# Patient Record
Sex: FEMALE | Race: WHITE | Employment: OTHER | URBAN - NONMETROPOLITAN AREA
[De-identification: names, ages, dates, MRNs, and addresses within clinical notes are randomized per-mention and may not be internally consistent; named-entity substitution may affect disease eponyms.]

---

## 2019-04-12 ENCOUNTER — TELEPHONE (OUTPATIENT)
Dept: FAMILY MEDICINE CLINIC | Age: 84
End: 2019-04-12

## 2020-08-18 ENCOUNTER — HOSPITAL ENCOUNTER (OUTPATIENT)
Dept: PHYSICAL THERAPY | Age: 85
Setting detail: THERAPIES SERIES
Discharge: HOME | End: 2020-08-18
Payer: COMMERCIAL

## 2020-08-18 PROCEDURE — 97110 THERAPEUTIC EXERCISES: CPT

## 2020-08-18 PROCEDURE — 97162 PT EVAL MOD COMPLEX 30 MIN: CPT

## 2020-08-18 ASSESSMENT — PAIN DESCRIPTION - PAIN TYPE: TYPE: CHRONIC PAIN

## 2020-08-18 ASSESSMENT — PAIN - FUNCTIONAL ASSESSMENT: PAIN_FUNCTIONAL_ASSESSMENT: PREVENTS OR INTERFERES SOME ACTIVE ACTIVITIES AND ADLS

## 2020-08-18 ASSESSMENT — PAIN DESCRIPTION - DESCRIPTORS: DESCRIPTORS: ACHING

## 2020-08-18 ASSESSMENT — PAIN DESCRIPTION - PROGRESSION: CLINICAL_PROGRESSION: NOT CHANGED

## 2020-08-18 ASSESSMENT — PAIN DESCRIPTION - LOCATION: LOCATION: SHOULDER

## 2020-08-18 ASSESSMENT — PAIN SCALES - WONG BAKER: WONGBAKER_NUMERICALRESPONSE: 8

## 2020-08-18 ASSESSMENT — PAIN DESCRIPTION - ONSET: ONSET: ON-GOING

## 2020-08-18 ASSESSMENT — PAIN DESCRIPTION - FREQUENCY: FREQUENCY: INTERMITTENT

## 2020-08-18 ASSESSMENT — PAIN DESCRIPTION - ORIENTATION: ORIENTATION: RIGHT;MID;UPPER

## 2020-08-18 NOTE — FLOWSHEET NOTE
Outpatient Physical Therapy  [facility]           [x] Phone: [phone]   Fax: [phone]  [facility] Lank           [] Phone: [phone]   Fax: [phone]        Physical Therapy Daily Treatment Note  Date:  2020    Patient Name:  Yulia Vitale    :  1935  MRN: [id number]  Restrictions/Precautions:  Other position/activity restrictions: none  Diagnosis:   Diagnosis: R shoulder pain  Date of Injury/Surgery:   Treatment Diagnosis: Treatment Diagnosis: R shoudler pain    Insurance/Certification information: PT Insurance Information: Medicare Advantage $40 co pay   Referring Physician:  Sanjuan Schlatter Next Doctor Visit:    Plan of care signed (Y/N):    Outcome Measure: LEFS  Visit# / total visits:  1 /  Pain level: 8/10  With movement   Goals:       Short term goals  Time Frame for Short term goals: 6 weeks  Short term goal 1: patient and her family will be compliant with HEP  Short term goal 2: patient's goal- no R shoulder pain       Summary of Evaluation:    Patient primary complaints: R shoulder pain  History of condition:ongoing R shoulder pain for about 1 year possibly due to overuse with reaching- has had 2 cortizone injections most recently about 6 months ago;   Current functional limitations:  R shoulder pain present/worse with reaching above shoulder level or out to side away from body  Clinical findings:seated AROM of R shoulder limited by pain- IR behind back to T12 before pain onset - FLEX to 90* before pain onset- patient demonstrates painful arc with R shoulder AROM in supine; unable to assess MMT due to  pain with AROM - R shoulder strength @ least 3+/5  R shoulder pain present with impingement testing  PLOF:enjoys gardening  Skilled PT interventions are intended to: establish HEP   PT program discussed with patient and patient's family due to patient's cognitive state- family in agreement with PT plan and goal of establishing HEP  Barriers to learning:cognitive barriers observed  Preferred learning style(s):   written- demonstration -practice  Preferred Language: English  Potential barriers to progress: cognitive barriers  The patient appears motivated to participate in PT : yes      Subjective:  See eval         Any changes in Ambulatory Summary Sheet? None        Objective:  See eval   Prior to today's treatment session, patient was screened for signs and symptoms related to COVID-19 including but not limited to verbally answering questions related to feeling ill, cough, or SOB, along with taking temperature via forehead thermometer. Patient presented with all negative signs and symptoms and had no fever >100 degrees Fahrenheit this date.          Exercises: (No more than 4 columns)   Exercise/Equipment Date 8/18/20 Date Date      PT eval     WARM UP                     TABLE      Supine  AROM     Seated  AROM                    Pulleys FLEX - painful     STANDING                                                     PROPRIOCEPTION                                    MODALITIES                      Other Therapeutic Activities/Education:        Home Exercise Program:  Supine  elevation AROM and seated internal rotation behind back AROM- family instructed- family expressed understanding      Manual Treatments:        Modalities:        Communication with other providers:        Assessment:  (Response towards treatment session) (Pain Rating)Pt tolerated tx well without any adverse reactions or complications this date; end session pain rating 0/10 pain @ rest         Plan for Next Session:        Time In / Time Out:    See gary       ITimed Code/Total Treatment Minutes:  25' TE x2/55' includes eval    Next Progress Note due:        Plan of Care Interventions:  [x] Therapeutic Exercise  [] Modalities:  [x] Therapeutic Activity     [] Ultrasound  [] Estim  [] Gait Training      [] Cervical Traction [] Lumbar Traction  [x] Neuromuscular Re-education    [] Cold/hotpack [] Iontophoresis   [x] Instruction in HEP      [] Vasopneumatic   [] Dry Needling    [x] Manual Therapy               [] Aquatic Therapy              Electronically signed by:  Becky Perales 8/18/2020, 5:44 PM

## 2020-08-18 NOTE — PLAN OF CARE
Outpatient Physical Therapy           [place]           [] Phone: [phone]   Fax: [phone]  [facility]           [x] Phone: [phone]   Fax: [phone]     To: Referring Practitioner: Jessie Blackman    From: Earle Park, PT     Patient: Bijal Angela       : 1935  Diagnosis: Diagnosis: R shoulder pain   Treatment Diagnosis: Treatment Diagnosis: R shoudler pain   Date: 2020    Dr. Jessie Blackman:        Prior to further PT, I would recommend an orthopaedic consult for detailed examination of Mrs. Vivas's chronic shoulder pain condition    Physical Therapy Certification/Re-Certification Form    The following patient has been evaluated for physical therapy services and for therapy to continue, insurance requires physician review of the treatment plan initially and every 90 days. Please review the attached evaluation and/or summary of the patient's plan of care, and verify that you agree therapy should continue by signing the attached document and sending it back to our office.             ASSESSMENT  Patient primary complaints: R shoulder pain  History of condition:ongoing R shoulder pain for about 1 year possibly due to overuse with reaching- has had 2 cortizone injections most recently about 6 months ago;   Current functional limitations:  R shoulder pain present/worse with reaching above shoulder level or out to side away from body  Clinical findings:seated AROM of R shoulder limited by pain- IR behind back to T12 before pain onset - FLEX to 90* before pain onset- patient demonstrates painful arc with R shoulder AROM in supine; unable to assess MMT due to  pain with AROM - R shoulder strength @ least 3+/5  R shoulder pain present with impingement testing  PLOF:enjoys gardening  Skilled PT interventions are intended to: establish HEP   PT program discussed with patient and patient's family due to patient's cognitive state- family in agreement with PT plan and goal of establishing HEP  Barriers to learning:cognitive barriers observed  Preferred learning style(s):   written- demonstration -practice  Preferred Language: English  Potential barriers to progress: cognitive barriers  The patient appears motivated to participate in PT : yes  Plan of Care/Treatment to date:  [x] Therapeutic Exercise  [] Modalities:  [] Therapeutic Activity     [] Ultrasound  [] Electrical Stimulation  [] Gait Training      [] Cervical Traction [] Lumbar Traction  [] Neuromuscular Re-education    [] Cold/hotpack [] Iontophoresis   [x] Instruction in HEP      [] Vasopneumatic     [x] Manual Therapy               [] Aquatic Therapy     Other:    Frequency/Duration- hold @ this time:  # Days per week: [] 1 day # Weeks: [] 1 week [] 5 weeks     [] 2 days   [] 2 weeks [] 6 weeks     [] 3 days   [] 3 weeks [] 7 weeks     [] 4 days   [] 4 weeks [] 8 weeks         [] 9 weeks [] 10 weeks         [] 11 weeks [] 12 weeks  Rehab Potential/Progress: [] Excellent [x] Good [] Fair  [] Poor   Goals:    Short term goals  Time Frame for Short term goals: 6 weeks  Short term goal 1: patient and her family will be compliant with HEP  Short term goal 2: patient's goal- no R shoulder pain   Electronically signed by:  Joseph Floyd PT, 8/18/2020, 2:55 PM  If you have any questions or concerns, please don't hesitate to call.   Thank you for your referral.      Physician Signature:________________________________Date:_________ TIME: _____  By signing above, therapists plan is approved by physician

## 2020-09-23 ENCOUNTER — OFFICE VISIT (OUTPATIENT)
Dept: ORTHOPEDIC SURGERY | Age: 85
End: 2020-09-23
Payer: COMMERCIAL

## 2020-09-23 VITALS — RESPIRATION RATE: 15 BRPM

## 2020-09-23 PROCEDURE — 20610 DRAIN/INJ JOINT/BURSA W/O US: CPT | Performed by: ORTHOPAEDIC SURGERY

## 2020-09-23 PROCEDURE — 99203 OFFICE O/P NEW LOW 30 MIN: CPT | Performed by: ORTHOPAEDIC SURGERY

## 2020-09-23 RX ORDER — DONEPEZIL HYDROCHLORIDE 10 MG/1
TABLET, FILM COATED ORAL
COMMUNITY
Start: 2020-08-05 | End: 2021-12-01 | Stop reason: HOSPADM

## 2020-09-23 RX ORDER — CITALOPRAM 20 MG/1
30 TABLET ORAL
COMMUNITY
Start: 2018-06-26 | End: 2021-08-19 | Stop reason: SINTOL

## 2020-09-23 RX ORDER — IBUPROFEN 200 MG
200 TABLET ORAL EVERY 6 HOURS PRN
COMMUNITY

## 2020-09-23 NOTE — PROGRESS NOTES
Subjective:      Patient ID: Macey Deluca is a 80 y.o. female. Pt is here today for right shoulder pain. Pt states pain has been going on for about a year now. Pt states that pain today is a 8/10 will increase with movement. Pt states that pain starts in the upper arm and will travel into the elbow. Pt states that she will have a sharp stabbing pain in the arm. She did receive a steroid injection by her PCP about a year ago. Pt states that the injection did not help with the pain. Pt states she did have an injury to the right shoulder about a year ago she fell on her shoulder going up a hill. She comes in today for her first visit with me in regards to her right shoulder pain. She states that over the past several months she has been having worsening sharp and stabbing pain in her right shoulder which is worse with overhead activities and when reaching behind her back. Patient denies any new injury to the involved extremity/ joint, denies numbness or tingling in the involved extremity and denies fever or chills. Review of Systems   Constitutional: Negative for activity change, chills and fever. Respiratory: Negative for shortness of breath. Cardiovascular: Negative for chest pain. Musculoskeletal: Positive for arthralgias and myalgias. Negative for gait problem and joint swelling. Skin: Negative for color change, pallor, rash and wound. Neurological: Positive for weakness. Negative for numbness. No past medical history on file. Objective:   Physical Exam  Constitutional:       Appearance: She is well-developed. HENT:      Head: Normocephalic and atraumatic. Eyes:      Pupils: Pupils are equal, round, and reactive to light. Neck:      Musculoskeletal: Normal range of motion. Pulmonary:      Effort: Pulmonary effort is normal.   Musculoskeletal: Normal range of motion. General: Tenderness present. No deformity.       Right shoulder: She exhibits tenderness, crepitus, pain and decreased strength. She exhibits normal range of motion, no bony tenderness, no swelling, no effusion, no deformity, no laceration, no spasm and normal pulse. Left shoulder: She exhibits normal range of motion, no tenderness, no bony tenderness, no swelling, no effusion, no crepitus, no deformity, no laceration, no pain, no spasm, normal pulse and normal strength. Right elbow: Normal.     Left elbow: Normal.   Skin:     General: Skin is warm and dry. Coloration: Skin is not pale. Findings: No erythema or rash. Neurological:      Mental Status: She is alert and oriented to person, place, and time. Sensory: No sensory deficit. Right shoulder-Skin intact with no erythema, ecchymosis or lacerations present. Flexion 180  Abduction 180  External rotation 90  Internal rotation 90  Positive Linares sign. Strength 4/5 to resisted abduction. Xr Shoulder Right (min 2 Views)    Result Date: 9/23/2020  XRAY X-ray 3 views of the right shoulder obtained and reviewed by me today in the office demonstrates age appropriate bone density throughout with a type II acromion, normal spacing in the acromioclavicular joint, no subluxation or dislocation noted, moderate diffuse osteopenia, no acute osseous abnormalities. Impression: Normal right shoulder with no acute process. Assessment:      Right shoulder rotator cuff tendinitis and impingement. Plan:      I discussed with her today her x-ray findings. I explained to her that she does have impingement or a possible rotator cuff tear in her right shoulder. Given her age we would likely not consider surgical treatment even if there is a tear in her rotator cuff. She agrees and would like to proceed with conservative treatment. At this point I recommend that we begin with conservative treatment. I discussed performing a cortisone injection with the patient today.   The patient agrees and would like to proceed with the cortisone injection. I explained to them that we can repeat these injections every 3 months if needed. Continue weight-bearing as tolerated. Continue range of motion exercises as instructed. Ice and elevate as needed. Tylenol or Motrin for pain. Follow up in 6 weeks for recheck. Subacromial Shoulder Injection Procedure Note    Pre-operative Diagnosis: Right rotator cuff tendinitis    Post-operative Diagnosis: same    Indications: Symptomatic relief of tendinitis    Anesthesia: Lidocaine 1% and marcaine 0.5% without epinephrine without added sodium bicarbonate    Procedure Details     Verbal consent was obtained for the procedure. The right shoulder was prepped with alcohol. A 22 gauge needle was advanced into the right subacromial space through posterior approach without difficulty  The space was then injected with 1 ml 1% lidocaine and 1 ml 0.5% marcaine and 1 ml of triamcinolone (KENALOG) 40mg/ml. The injection site was cleansed with isopropyl alcohol and a dressing was applied. Complications:  None; patient tolerated the procedure well. Symptoms were improved immediately after the injection.           Elisa 97, DO

## 2020-09-23 NOTE — PATIENT INSTRUCTIONS
Continue weight-bearing as tolerated. Continue range of motion exercises as instructed. Ice and elevate as needed. Tylenol or Motrin for pain. Steroid injection given today in the right shoulder  Monitor blood sugar levels for 24/72 hours  Follow up in 6 weeks at that time if not any better we will think about a MRI of the right shoulder  Patient Education        Rotator Cuff: Exercises  Introduction  Here are some examples of exercises for you to try. The exercises may be suggested for a condition or for rehabilitation. Start each exercise slowly. Ease off the exercises if you start to have pain. You will be told when to start these exercises and which ones will work best for you. How to do the exercises  Pendulum swing   If you have pain in your back, do not do this exercise. 1. Hold on to a table or the back of a chair with your good arm. Then bend forward a little and let your sore arm hang straight down. This exercise does not use the arm muscles. Rather, use your legs and your hips to create movement that makes your arm swing freely. 2. Use the movement from your hips and legs to guide the slightly swinging arm back and forth like a pendulum (or elephant trunk). Then guide it in circles that start small (about the size of a dinner plate). Make the circles a bit larger each day, as your pain allows. 3. Do this exercise for 5 minutes, 5 to 7 times each day. 4. As you have less pain, try bending over a little farther to do this exercise. This will increase the amount of movement at your shoulder. Posterior stretching exercise   1. Hold the elbow of your injured arm with your other hand. 2. Use your hand to pull your injured arm gently up and across your body. You will feel a gentle stretch across the back of your injured shoulder. 3. Hold for at least 15 to 30 seconds. Then slowly lower your arm. 4. Repeat 2 to 4 times.     Up-the-back stretch   Your doctor or physical therapist may want you to wait to do this stretch until you have regained most of your range of motion and strength. You can do this stretch in different ways. Hold any of these stretches for at least 15 to 30 seconds. Repeat them 2 to 4 times. 1. Light stretch: Put your hand in your back pocket. Let it rest there to stretch your shoulder. 2. Moderate stretch: With your other hand, hold your injured arm (palm outward) behind your back by the wrist. Pull your arm up gently to stretch your shoulder. 3. Advanced stretch: Put a towel over your other shoulder. Put the hand of your injured arm behind your back. Now hold the back end of the towel. With the other hand, hold the front end of the towel in front of your body. Pull gently on the front end of the towel. This will bring your hand farther up your back to stretch your shoulder. Overhead stretch   1. Standing about an arm's length away, grasp onto a solid surface. You could use a countertop, a doorknob, or the back of a sturdy chair. 2. With your knees slightly bent, bend forward with your arms straight. Lower your upper body, and let your shoulders stretch. 3. As your shoulders are able to stretch farther, you may need to take a step or two backward. 4. Hold for at least 15 to 30 seconds. Then stand up and relax. If you had stepped back during your stretch, step forward so you can keep your hands on the solid surface. 5. Repeat 2 to 4 times. Shoulder flexion (lying down)   To make a wand for this exercise, use a piece of PVC pipe or a broom handle with the broom removed. Make the wand about a foot wider than your shoulders. 1. Lie on your back, holding a wand with both hands. Your palms should face down as you hold the wand. 2. Keeping your elbows straight, slowly raise your arms over your head. Raise them until you feel a stretch in your shoulders, upper back, and chest.  3. Hold for 15 to 30 seconds. 4. Repeat 2 to 4 times.     Shoulder rotation (lying down)   To make a wand for this exercise, use a piece of PVC pipe or a broom handle with the broom removed. Make the wand about a foot wider than your shoulders. 1. Lie on your back. Hold a wand with both hands with your elbows bent and palms up. 2. Keep your elbows close to your body, and move the wand across your body toward the sore arm. 3. Hold for 8 to 12 seconds. 4. Repeat 2 to 4 times. Wall climbing (to the side)   Avoid any movement that is straight to your side, and be careful not to arch your back. Your arm should stay about 30 degrees to the front of your side. 1. Stand with your side to a wall so that your fingers can just touch it at an angle about 30 degrees toward the front of your body. 2. Walk the fingers of your injured arm up the wall as high as pain permits. Try not to shrug your shoulder up toward your ear as you move your arm up. 3. Hold that position for a count of at least 15 to 20.  4. Walk your fingers back down to the starting position. 5. Repeat at least 2 to 4 times. Try to reach higher each time. Wall climbing (to the front)   During this stretching exercise, be careful not to arch your back. 1. Face a wall, and stand so your fingers can just touch it. 2. Keeping your shoulder down, walk the fingers of your injured arm up the wall as high as pain permits. (Don't shrug your shoulder up toward your ear.)  3. Hold your arm in that position for at least 15 to 30 seconds. 4. Slowly walk your fingers back down to where you started. 5. Repeat at least 2 to 4 times. Try to reach higher each time. Shoulder blade squeeze   1. Stand with your arms at your sides, and squeeze your shoulder blades together. Do not raise your shoulders up as you squeeze. 2. Hold 6 seconds. 3. Repeat 8 to 12 times. Scapular exercise: Arm reach   1. Lie flat on your back. This exercise is a very slight motion that starts with your arms raised (elbows straight, arms straight).   2. From this position, reach higher toward the yodit or ceiling. Keep your elbows straight. All motion should be from your shoulder blade only. 3. Relax your arms back to where you started. 4. Repeat 8 to 12 times. Arm raise to the side   During this strengthening exercise, your arm should stay about 30 degrees to the front of your side. 1. Slowly raise your injured arm to the side, with your thumb facing up. Raise your arm 60 degrees at the most (shoulder level is 90 degrees). 2. Hold the position for 3 to 5 seconds. Then lower your arm back to your side. If you need to, bring your \"good\" arm across your body and place it under the elbow as you lower your injured arm. Use your good arm to keep your injured arm from dropping down too fast.  3. Repeat 8 to 12 times. 4. When you first start out, don't hold any extra weight in your hand. As you get stronger, you may use a 1-pound to 2-pound dumbbell or a small can of food. Shoulder flexor and extensor exercise   These are isometric exercises. That means you contract your muscles without actually moving. 1. Push forward (flex): Stand facing a wall or doorjamb, about 6 inches or less back. Hold your injured arm against your body. Make a closed fist with your thumb on top. Then gently push your hand forward into the wall with about 25% to 50% of your strength. Don't let your body move backward as you push. Hold for about 6 seconds. Relax for a few seconds. Repeat 8 to 12 times. 2. Push backward (extend): Stand with your back flat against a wall. Your upper arm should be against the wall, with your elbow bent 90 degrees (your hand straight ahead). Push your elbow gently back against the wall with about 25% to 50% of your strength. Don't let your body move forward as you push. Hold for about 6 seconds. Relax for a few seconds. Repeat 8 to 12 times. Scapular exercise: Wall push-ups   This exercise is best done with your fingers somewhat turned out, rather than straight up and down.   1. Stand facing a wall, about 12 inches to 18 inches away. 2. Place your hands on the wall at shoulder height. 3. Slowly bend your elbows and bring your face to the wall. Keep your back and hips straight. 4. Push back to where you started. 5. Repeat 8 to 12 times. 6. When you can do this exercise against a wall comfortably, you can try it against a counter. You can then slowly progress to the end of a couch, then to a sturdy chair, and finally to the floor. Scapular exercise: Retraction   For this exercise, you will need elastic exercise material, such as surgical tubing or Thera-Band. 1. Put the band around a solid object at about waist level. (A bedpost will work well.) Each hand should hold an end of the band. 2. With your elbows at your sides and bent to 90 degrees, pull the band back. Your shoulder blades should move toward each other. Then move your arms back where you started. 3. Repeat 8 to 12 times. 4. If you have good range of motion in your shoulders, try this exercise with your arms lifted out to the sides. Keep your elbows at a 90-degree angle. Raise the elastic band up to about shoulder level. Pull the band back to move your shoulder blades toward each other. Then move your arms back where you started. Internal rotator strengthening exercise   1. Start by tying a piece of elastic exercise material to a doorknob. You can use surgical tubing or Thera-Band. 2. Stand or sit with your shoulder relaxed and your elbow bent 90 degrees. Your upper arm should rest comfortably against your side. Squeeze a rolled towel between your elbow and your body for comfort. This will help keep your arm at your side. 3. Hold one end of the elastic band in the hand of the painful arm. 4. Slowly rotate your forearm toward your body until it touches your belly. Slowly move it back to where you started. 5. Keep your elbow and upper arm firmly tucked against the towel roll or at your side. 6. Repeat 8 to 12 times.

## 2020-09-24 ASSESSMENT — ENCOUNTER SYMPTOMS
SHORTNESS OF BREATH: 0
COLOR CHANGE: 0

## 2021-08-10 ENCOUNTER — HOSPITAL ENCOUNTER (OUTPATIENT)
Age: 86
Discharge: HOME | End: 2021-08-10
Payer: COMMERCIAL

## 2021-08-10 LAB
BACTERIA: ABNORMAL /HPF
BILIRUBIN URINE: NEGATIVE MG/DL
BLOOD, URINE: ABNORMAL
CAST TYPE: ABNORMAL /HPF
CLARITY: CLEAR
COLOR: YELLOW
CRYSTAL TYPE: NEGATIVE /HPF
EPITHELIAL CELLS, UA: 5 /HPF
GLUCOSE, URINE: NEGATIVE MG/DL
KETONES, URINE: NEGATIVE MG/DL
LEUKOCYTE ESTERASE, URINE: NEGATIVE
NITRITE URINE, QUANTITATIVE: NEGATIVE
PH, URINE: 7 (ref 5–8)
PROTEIN UA: NEGATIVE MG/DL
RBC URINE: 1 /HPF (ref 0–6)
SPECIFIC GRAVITY UA: 1.01 (ref 1–1.03)
UROBILINOGEN, URINE: 0.2 MG/DL (ref 0.2–1)
WBC UA: 3 /HPF (ref 0–5)

## 2021-08-10 PROCEDURE — 81001 URINALYSIS AUTO W/SCOPE: CPT

## 2021-08-19 ENCOUNTER — HOSPITAL ENCOUNTER (OUTPATIENT)
Age: 86
Discharge: HOME | End: 2021-08-19
Payer: COMMERCIAL

## 2021-08-19 ENCOUNTER — HOSPITAL ENCOUNTER (OUTPATIENT)
Dept: PSYCHIATRY | Age: 86
Setting detail: THERAPIES SERIES
Discharge: HOME | End: 2021-08-19
Payer: COMMERCIAL

## 2021-08-19 VITALS
DIASTOLIC BLOOD PRESSURE: 66 MMHG | OXYGEN SATURATION: 100 % | SYSTOLIC BLOOD PRESSURE: 136 MMHG | RESPIRATION RATE: 17 BRPM | HEART RATE: 69 BPM

## 2021-08-19 DIAGNOSIS — F33.1 MODERATE EPISODE OF RECURRENT MAJOR DEPRESSIVE DISORDER (HCC): Primary | ICD-10-CM

## 2021-08-19 DIAGNOSIS — F03.91 MAJOR NEUROCOGNITIVE DISORDER DUE TO ALZHEIMER'S DISEASE, PROBABLE, WITH BEHAVIORAL DISTURBANCE: ICD-10-CM

## 2021-08-19 LAB
ALBUMIN SERPL-MCNC: 4.3 GM/DL (ref 3.4–5)
ALP BLD-CCNC: 65 IU/L (ref 40–129)
ALT SERPL-CCNC: 8 U/L (ref 10–40)
ANION GAP SERPL CALCULATED.3IONS-SCNC: 10 MMOL/L (ref 4–16)
AST SERPL-CCNC: 15 IU/L (ref 15–37)
BASOPHILS ABSOLUTE: 0.1 K/CU MM
BASOPHILS RELATIVE PERCENT: 0.7 % (ref 0–1)
BILIRUB SERPL-MCNC: 0.4 MG/DL (ref 0–1)
BUN BLDV-MCNC: 19 MG/DL (ref 6–23)
CALCIUM SERPL-MCNC: 9.7 MG/DL (ref 8.3–10.6)
CHLORIDE BLD-SCNC: 104 MMOL/L (ref 99–110)
CO2: 25 MMOL/L (ref 21–32)
CREAT SERPL-MCNC: 0.9 MG/DL (ref 0.6–1.1)
DIFFERENTIAL TYPE: ABNORMAL
EOSINOPHILS ABSOLUTE: 0.1 K/CU MM
EOSINOPHILS RELATIVE PERCENT: 1.4 % (ref 0–3)
GFR AFRICAN AMERICAN: >60 ML/MIN/1.73M2
GFR NON-AFRICAN AMERICAN: 60 ML/MIN/1.73M2
GLUCOSE BLD-MCNC: 101 MG/DL (ref 70–99)
HCT VFR BLD CALC: 39.9 % (ref 37–47)
HEMOGLOBIN: 13.4 GM/DL (ref 12.5–16)
IMMATURE NEUTROPHIL %: 0.1 % (ref 0–0.43)
LYMPHOCYTES ABSOLUTE: 1.7 K/CU MM
LYMPHOCYTES RELATIVE PERCENT: 24 % (ref 24–44)
MCH RBC QN AUTO: 29.3 PG (ref 27–31)
MCHC RBC AUTO-ENTMCNC: 33.6 % (ref 32–36)
MCV RBC AUTO: 87.1 FL (ref 78–100)
MONOCYTES ABSOLUTE: 0.5 K/CU MM
MONOCYTES RELATIVE PERCENT: 6.8 % (ref 0–4)
PDW BLD-RTO: 12.8 % (ref 11.7–14.9)
PLATELET # BLD: 243 K/CU MM (ref 140–440)
PMV BLD AUTO: 9.4 FL (ref 7.5–11.1)
POTASSIUM SERPL-SCNC: 4.7 MMOL/L (ref 3.5–5.1)
RBC # BLD: 4.58 M/CU MM (ref 4.2–5.4)
SEGMENTED NEUTROPHILS ABSOLUTE COUNT: 4.8 K/CU MM
SEGMENTED NEUTROPHILS RELATIVE PERCENT: 67 % (ref 36–66)
SODIUM BLD-SCNC: 139 MMOL/L (ref 135–145)
TOTAL IMMATURE NEUTOROPHIL: 0.01 K/CU MM
TOTAL PROTEIN: 6.8 GM/DL (ref 6.4–8.2)
WBC # BLD: 7.2 K/CU MM (ref 4–10.5)

## 2021-08-19 PROCEDURE — 90792 PSYCH DIAG EVAL W/MED SRVCS: CPT | Performed by: NURSE PRACTITIONER

## 2021-08-19 PROCEDURE — 80053 COMPREHEN METABOLIC PANEL: CPT

## 2021-08-19 PROCEDURE — 36415 COLL VENOUS BLD VENIPUNCTURE: CPT

## 2021-08-19 PROCEDURE — 85025 COMPLETE CBC W/AUTO DIFF WBC: CPT

## 2021-08-19 RX ORDER — DIVALPROEX SODIUM 250 MG/1
250 TABLET, EXTENDED RELEASE ORAL NIGHTLY
Qty: 30 TABLET | Refills: 0 | Status: SHIPPED | OUTPATIENT
Start: 2021-08-19 | End: 2021-08-27

## 2021-08-19 RX ORDER — OMEGA-3S/DHA/EPA/FISH OIL/D3 300MG-1000
400 CAPSULE ORAL DAILY
COMMUNITY

## 2021-08-19 RX ORDER — SERTRALINE HYDROCHLORIDE 25 MG/1
25 TABLET, FILM COATED ORAL DAILY
Qty: 30 TABLET | Refills: 1 | Status: SHIPPED | OUTPATIENT
Start: 2021-09-09 | End: 2021-09-21

## 2021-08-19 NOTE — PROGRESS NOTES
Behavioral Health Consultation  Lilian Jones PMHNP-BC  8/19/2021, 4:22 PM      Time spent with Patient:  60 minutes  This was a outpatient visit. Patient Location: Home. Provider Location: [facility]    Chief Complaint: Major neurocognitive disorder- new aggression, emotional, low bp and frequent falls. Referred: Jairo Lezama RN ([facility])    Recent urine- negative for nitrites and leukocytes, many bacteria  8/10/21  Patient with daughter, Yue Lemus. Patient with long term and short term memory loss, crying spells, paranoid delusions ( son is trying to kill her,  doesn't love her, Jewel Ragsdale is not her daughter). Patient lives with 80year old spouse. Patient only lets him attend to her personal care. Patient's son lives with them and the 4 daughters live nearby. She wants to be able to care for her home and yard and when family attempts to help, becomes angry. This is exhausting her spouse. Sleeping ok. Appetite is ok but has declined. Denies hearing things that are not there. Will see things that are not there. Has anger outbursts. Some paranoia. [place]:  Reason for visit is medication management initial appointment. She has been compliant with medications. Pt reports that medications have not  been working. Pt denies side effects from medications. Pt admits to hallucinations- visual.  Pt reports there has been changes to appetite which is decreased. Pt reports sleep has been ok. Pt denies  current exercise. Pt denies current suicidal ideation, plan and intent. Pt  denies current homicidal ideation, plan and [email or web address]). Social:  Born in [facility]. 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 Had 6 girls and one boy. Denies [employer].     Past Psychiatric history: The patient has a history of  depression and dementia. Current treatment includes Anti-depressant: citalopram 30 mg. Hasn't been helpful. Patient denies se from current treatment. Previous treatment has included: Celexa- 30 mg at bedtime. Family Mental Health history:   Pertinent family history:  .Grandmother- \"dementia\", Jasmyn(sister at 79) passed dementia    MSE:    Appearance: alert, cooperative  Attention:Limited  Appetite: abnormal: decreased  Ambulation: unable to assess. Sleep disturbance: No  Loss of pleasure: Yes  Speech: poverty of speech and low productivity  Mood: Depressed  Affect: depressed affect  Thought Content: paranoid thoughts  Insight: Poor  Judgment: Impaired  Memory: Long-term impaired and Short-term impaired  Suicide Assessment: no suicidal ideation  Homicide Assessment: denies current homicidal ideation, plan and intent    History:      Review of Systems:       Current Outpatient Medications:     vitamin D3 (CHOLECALCIFEROL) 10 MCG (400 UNIT) TABS tablet, Take 400 Units by mouth daily, Disp: , Rfl:     divalproex (DEPAKOTE ER) 250 MG extended release tablet, Take 1 tablet by mouth nightly, Disp: 30 tablet, Rfl: 0    [START ON 9/9/2021] sertraline (ZOLOFT) 25 MG tablet, Take 1 tablet by mouth daily, Disp: 30 tablet, Rfl: 1    donepezil (ARICEPT) 10 MG tablet, , Disp: , Rfl:     ibuprofen (ADVIL;MOTRIN) 200 MG tablet, Take 200 mg by mouth every 6 hours as needed for Pain, Disp: , Rfl:      PDMP Monitoring:    Last PDMP Daryl as Reviewed [facility]):  Review User Review Instant Review Result            Urine Drug Screenings (1 yr)    No resulted procedures found. Medication Contract and Consent for Opioid Use Documents Filed      No documents found                 OARRS checked and there were no signs of substance abuse, or prescription misuse. Social History     Socioeconomic History    Marital status:       Spouse name: Not on file    Number of children: Not on disorder due to Alzheimer's disease, probable, with behavioral disturbance ([facility] [address]      Plan:    Discussed decrease the citalopram 20 mg for one week, then decrease to 10 mg for one week and stop. Will start zoloft 25 mg in three weeks. Start depakote  mg at bedtime. Call in one week and will discuss starting risperidone. Discussed risks and benefits of medications, as well as need for yearly lab work. Follow up with provider in three weeks. Continue work with PCP to manage medical concerns, and [facility] for continued follow-up. No orders of the defined types were placed in this encounter.      Orders Placed This Encounter   Medications    divalproex (DEPAKOTE ER) 250 MG extended release tablet     Sig: Take 1 tablet by mouth nightly     Dispense:  30 tablet     Refill:  0    sertraline (ZOLOFT) 25 MG tablet     Sig: Take 1 tablet by mouth daily     Dispense:  30 tablet     Refill:  1       Pt interventions:    Discussed importance of medication adherence, Established rapport and Supportive techniques

## 2021-08-27 RX ORDER — DIVALPROEX SODIUM 125 MG/1
250 CAPSULE, COATED PELLETS ORAL 2 TIMES DAILY
Qty: 60 CAPSULE | Refills: 1 | Status: SHIPPED | OUTPATIENT
Start: 2021-08-27 | End: 2021-08-27

## 2021-08-27 RX ORDER — DIVALPROEX SODIUM 125 MG/1
250 CAPSULE, COATED PELLETS ORAL 2 TIMES DAILY
Qty: 120 CAPSULE | Refills: 1 | Status: SHIPPED | OUTPATIENT
Start: 2021-08-27 | End: 2021-09-21

## 2021-08-27 NOTE — TELEPHONE ENCOUNTER
Patient's daughter Miranda Garcia RN requesting depakote sprinkles as patient having difficulty swallowing medications. She is also still agitated at times. Increasing depakote 250 po sprinkles bid.  #120 capsules, one refill

## 2021-09-01 ENCOUNTER — TELEPHONE (OUTPATIENT)
Dept: PSYCHIATRY | Age: 86
End: 2021-09-01

## 2021-09-01 NOTE — TELEPHONE ENCOUNTER
Discussed with patient's daughter Arden Harris patient's recent agitation, comments on not feeling loved, and passive thoughts of death. She has shown an increase in paranoia. Support and education provided. Discussed starting risperidone 0.25 mg po at bedtime for increase in paranoia and other delusions. Will hold off starting this medication. Recommend  250 mg depakote sprinkles bid. Per Arden Harris, the patient likes to sleep in and she gives her morning dose at 11-11:30 am. She then returns to the home and gives night time doses at 1900. Will request depakote level at next visit. She is also to initiate zoloft 25 mg po on Sunday. Discussed taking with food. Patient has hx of zoloft with diarrhea. She is willing to have medication trial again. Continue aricept at bedtime.

## 2021-09-21 ENCOUNTER — HOSPITAL ENCOUNTER (OUTPATIENT)
Dept: PSYCHIATRY | Age: 86
Setting detail: THERAPIES SERIES
Discharge: HOME | End: 2021-09-21
Payer: COMMERCIAL

## 2021-09-21 DIAGNOSIS — F02.81 ALZHEIMER'S DEMENTIA WITH BEHAVIORAL DISTURBANCE, UNSPECIFIED TIMING OF DEMENTIA ONSET: ICD-10-CM

## 2021-09-21 DIAGNOSIS — G30.9 ALZHEIMER'S DEMENTIA WITH BEHAVIORAL DISTURBANCE, UNSPECIFIED TIMING OF DEMENTIA ONSET: ICD-10-CM

## 2021-09-21 DIAGNOSIS — F03.91 MAJOR NEUROCOGNITIVE DISORDER DUE TO ALZHEIMER'S DISEASE, PROBABLE, WITH BEHAVIORAL DISTURBANCE: Primary | ICD-10-CM

## 2021-09-21 DIAGNOSIS — F33.1 MODERATE RECURRENT MAJOR DEPRESSION (HCC): ICD-10-CM

## 2021-09-21 PROBLEM — F02.80 ALZHEIMER'S DEMENTIA (HCC): Status: ACTIVE | Noted: 2021-09-21

## 2021-09-21 PROCEDURE — 90832 PSYTX W PT 30 MINUTES: CPT | Performed by: NURSE PRACTITIONER

## 2021-09-21 RX ORDER — DIVALPROEX SODIUM 125 MG/1
250 CAPSULE, COATED PELLETS ORAL 2 TIMES DAILY
Qty: 60 CAPSULE | Refills: 1 | Status: SHIPPED | OUTPATIENT
Start: 2021-09-21 | End: 2021-10-26

## 2021-09-21 RX ORDER — SERTRALINE HYDROCHLORIDE 25 MG/1
25 TABLET, FILM COATED ORAL DAILY
Qty: 30 TABLET | Refills: 1 | Status: SHIPPED | OUTPATIENT
Start: 2021-09-21 | End: 2021-10-30

## 2021-09-21 NOTE — PROGRESS NOTES
Behavioral Health Consultation  Adela Laguna PMHNP-BC  9/21/2021, 3:05 PM      Time spent with Patient:  30 minutes  This was a outpatient visit. Patient Location: Home. Visit via telephone. Daughter could not get her mother to the appointment. Provider Location: [facility]    Chief Complaint:   Major neurocognitive disorder- new aggression, emotional, low bp and frequent falls.     Referred: Natasha Luis RN ([facility])     Recent urine- negative for nitrites and leukocytes, many bacteria  8/10/21  Patient with daughter, Nilton Khan. Patient with long term and short term memory loss, crying spells, paranoid delusions ( son is trying to kill her,  doesn't love her, Tyrone Mora is not her daughter). Patient lives with 80year old spouse. Patient only lets him attend to her personal care. Patient's son lives with them and the 4 daughters live nearby. She wants to be able to care for her home and yard and when family attempts to help, becomes angry. This is exhausting her spouse.     Sleeping ok. Appetite is ok but has declined. Denies hearing things that are not there. Will see things that are not there. Has anger outbursts which has decresed. There is some paranoia still. .  Becomes fixated on things, paranoid and \"will pepper \"\" with questions. \" she is more confused; not making sense. But is calmer. No diarrhea. Agitation is improved. [place]:  Reason for visit is medication management follow up. She has been compliant with medications. Pt reports that medications have  been working. Pt denies side effects from medications. Pt denies hallucinations. Pt reports there has been no changes to appetite. Pt reports sleep has been ok. Pt denies  current exercise. Pt denies current suicidal ideation, plan and intent. Pt  denies current homicidal ideation, plan and [email or web address]). Social:Born in [facility].  Had two brothers- Emiliano(oldest) and Anthony Wang, Has two sisters Davon Tom and DEANDRE ECKERT) Graduated from [employer]. 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 Denies [employer].     Past Psychiatric history:   The patient has a history of  depression and dementia. Current treatment includes Anti-depressant: citalopram 30 mg. Hasn't been helpful. Patient denies se from current treatment. Previous treatment has included: Celexa- 30 mg at bedtime. Family Mental Health history:   Pertinent family history:  .Grandmother- \"dementia\", Jasmyn(sister at 79) passed dementia  MSE:  Unable to complete     History:      Review of Systems:       Current Outpatient Medications:     sertraline (ZOLOFT) 25 MG tablet, Take 1 tablet by mouth daily, Disp: 30 tablet, Rfl: 1    divalproex (DEPAKOTE SPRINKLES) 125 MG capsule, Take 2 capsules by mouth 2 times daily, Disp: 60 capsule, Rfl: 1    vitamin D3 (CHOLECALCIFEROL) 10 MCG (400 UNIT) TABS tablet, Take 400 Units by mouth daily, Disp: , Rfl:     donepezil (ARICEPT) 10 MG tablet, , Disp: , Rfl:     ibuprofen (ADVIL;MOTRIN) 200 MG tablet, Take 200 mg by mouth every 6 hours as needed for Pain, Disp: , Rfl:      PDMP Monitoring:    Last PDMP Daryl as Reviewed [facility]):  Review User Review Instant Review Result            Urine Drug Screenings (1 yr)    No resulted procedures found. Medication Contract and Consent for Opioid Use Documents Filed      No documents found                 OARRS checked and there were no signs of substance abuse, or prescription misuse. Social History     Socioeconomic History    Marital status:       Spouse name: Not on file    Number of children: Not on file    Years of education: Not on file    Highest education level: Not on file   Occupational History    Not on file   Tobacco Use    Smoking status: Not on file   Substance and Sexual Activity    Alcohol use: Not on file    Drug use: Not on LABGLOM 60 (L) 08/19/2021    GFRAA >60 08/19/2021      . last    Diagnosis:      1. Major neurocognitive disorder due to Alzheimer's disease, probable, with behavioral disturbance ([facility] [address]    2. Alzheimer's dementia with behavioral disturbance, unspecified timing of dementia onset ([facility] [address]    3. Moderate recurrent major depression (HCC)      Plan:    Discussed Continue zoloft 25 mg po daily. New rx for 30 days, on refill. continue depakote sprinkles 250  Mg bid (11 and then 1900 in order for dtr to provide meds) Discussed risperidone- will not start at this time. Follow up with psychiatry in 6 weeks. After that appointment will do depakote level and CMP for na+ level. Encouraged helena intermittently  Continue work with PCP to manage medical concerns, and [facility] for continued follow-up. No orders of the defined types were placed in this encounter.      Orders Placed This Encounter   Medications    sertraline (ZOLOFT) 25 MG tablet     Sig: Take 1 tablet by mouth daily     Dispense:  30 tablet     Refill:  1    divalproex (DEPAKOTE SPRINKLES) 125 MG capsule     Sig: Take 2 capsules by mouth 2 times daily     Dispense:  60 capsule     Refill:  1       Pt interventions:    Discussed importance of medication adherence

## 2021-10-13 DIAGNOSIS — F41.9 ANXIETY: Primary | ICD-10-CM

## 2021-10-13 RX ORDER — LORAZEPAM 1 MG/1
1 TABLET ORAL NIGHTLY PRN
Qty: 30 TABLET | Refills: 0 | Status: SHIPPED | OUTPATIENT
Start: 2021-10-13 | End: 2021-11-12

## 2021-10-13 NOTE — TELEPHONE ENCOUNTER
Patient up throughout night, agitated and aggressive per her daughter Holly Jama RN. Will start Ativan one mg HS PRN.

## 2021-10-26 RX ORDER — DIVALPROEX SODIUM 125 MG/1
250 CAPSULE, COATED PELLETS ORAL 2 TIMES DAILY
Qty: 60 CAPSULE | Refills: 1 | Status: SHIPPED | OUTPATIENT
Start: 2021-10-26 | End: 2021-10-30

## 2021-10-30 DIAGNOSIS — F33.1 MODERATE RECURRENT MAJOR DEPRESSION (HCC): ICD-10-CM

## 2021-10-30 RX ORDER — DIVALPROEX SODIUM 125 MG/1
375 CAPSULE, COATED PELLETS ORAL 2 TIMES DAILY
Qty: 60 CAPSULE | Refills: 1 | Status: SHIPPED
Start: 2021-10-30 | End: 2021-11-15 | Stop reason: DRUGHIGH

## 2021-11-05 ENCOUNTER — TELEPHONE (OUTPATIENT)
Dept: PSYCHIATRY | Age: 86
End: 2021-11-05

## 2021-11-05 NOTE — TELEPHONE ENCOUNTER
LM in regards to rescheduling patient OP [facility] appt on 11/09/2021 due to provider unfortunately being out of office.

## 2021-11-10 ENCOUNTER — TELEPHONE (OUTPATIENT)
Dept: PSYCHIATRY | Age: 86
End: 2021-11-10

## 2021-11-10 DIAGNOSIS — G30.9 ALZHEIMER'S DEMENTIA WITH BEHAVIORAL DISTURBANCE, UNSPECIFIED TIMING OF DEMENTIA ONSET: Primary | ICD-10-CM

## 2021-11-10 DIAGNOSIS — Z79.899 ON VALPROIC ACID THERAPY: ICD-10-CM

## 2021-11-10 DIAGNOSIS — F02.81 ALZHEIMER'S DEMENTIA WITH BEHAVIORAL DISTURBANCE, UNSPECIFIED TIMING OF DEMENTIA ONSET: Primary | ICD-10-CM

## 2021-11-10 RX ORDER — QUETIAPINE FUMARATE 25 MG/1
25 TABLET, FILM COATED ORAL 2 TIMES DAILY
Qty: 60 TABLET | Refills: 0 | Status: SHIPPED
Start: 2021-11-10 | End: 2021-11-19 | Stop reason: SINTOL

## 2021-11-10 NOTE — TELEPHONE ENCOUNTER
Spoke at length with patient's daughter Edelmira Palmer. She states her mother is constantly pacing, talking to imaginary people, and not sleeping. She also notes patient is more confused and suspicious. Zoloft recently increased from 25 mg to 50 mg. Will decrease back to 25 mg daily. Will also decrease Depakote from 375 mg BID to 375 mg daily as increase in Depakote has not improved patient's behaviors. Will start Seroquel 25 mg BID to see if patient's behavior improve and if she starts sleeping at night. Will check labs as well. Patient's daughter will bring patient to the hospital to get labs drawn as soon as possible. Will touch base again over the phone next week. Will need to reschedule her appointment on 11/16/2021 with Dorcas Momin since she will not be back to work by that date. Orders Placed This Encounter   Medications    QUEtiapine (SEROQUEL) 25 MG tablet     Sig: Take 1 tablet by mouth 2 times daily     Dispense:  60 tablet     Refill:  0     Orders Placed This Encounter   Procedures    Valproic acid level, total and free     Standing Status:   Future     Standing Expiration Date:   11/10/2022    Basic Metabolic Panel     Standing Status:   Future     Standing Expiration Date:   11/10/2022    CBC Auto Differential     Standing Status:   Future     Standing Expiration Date:   11/10/2022    Urinalysis with Microscopic     Standing Status:   Future     Standing Expiration Date:   11/10/2022     Order Specific Question:   SPECIFY(EX-CATH,MIDSTREAM,CYSTO,ETC)?      Answer:   Mid-stream

## 2021-11-15 ENCOUNTER — TELEPHONE (OUTPATIENT)
Dept: PSYCHIATRY | Age: 86
End: 2021-11-15

## 2021-11-15 DIAGNOSIS — G30.9 ALZHEIMER'S DEMENTIA WITH BEHAVIORAL DISTURBANCE, UNSPECIFIED TIMING OF DEMENTIA ONSET: Primary | ICD-10-CM

## 2021-11-15 DIAGNOSIS — F33.1 MODERATE RECURRENT MAJOR DEPRESSION (HCC): ICD-10-CM

## 2021-11-15 DIAGNOSIS — F02.81 ALZHEIMER'S DEMENTIA WITH BEHAVIORAL DISTURBANCE, UNSPECIFIED TIMING OF DEMENTIA ONSET: Primary | ICD-10-CM

## 2021-11-15 DIAGNOSIS — R63.0 POOR APPETITE: ICD-10-CM

## 2021-11-15 RX ORDER — DRONABINOL 2.5 MG/1
2.5 CAPSULE ORAL
Qty: 60 CAPSULE | Refills: 0 | Status: SHIPPED | OUTPATIENT
Start: 2021-11-15 | End: 2021-12-01 | Stop reason: SDUPTHER

## 2021-11-15 RX ORDER — DIVALPROEX SODIUM 125 MG/1
500 CAPSULE, COATED PELLETS ORAL 2 TIMES DAILY
Qty: 240 CAPSULE | Refills: 1 | Status: SHIPPED | OUTPATIENT
Start: 2021-11-15 | End: 2021-12-01 | Stop reason: SDUPTHER

## 2021-11-15 NOTE — TELEPHONE ENCOUNTER
Assisted with rescheduling client. New appointment is 12/1/21 at 1100. Also had question r/t Haldol starting immediately. Per NP; Earnestine Almanza, she wants to start the pt on Depakote and then see on Thursday 11/18 if [facility] is needed. Information given.

## 2021-11-15 NOTE — TELEPHONE ENCOUNTER
Spoke with Carlos, patient's daughter. She states Seroquel caused patient to become agitated, more \"on a mission\" Patient had \"a crazed look in her eyes. \"    · Increase Depakote back to 375 mg BID today, then tomorrow start Depakote 500 mg BID. · Increase Zoloft from 25 mg daily to previous dose of Zoloft 50 mg.  · Will start Marinol 2.5 mg BID. · Check back on Thursday to see how patient is doing, then consider adding Haldol 0.5 mg BID. · Schedule patient to be seen ASAP for GeneSight testing to guide future medical decision making and to obtain routine lab work    Orders Placed This Encounter   Medications    divalproex (DEPAKOTE SPRINKLES) 125 MG capsule     Sig: Take 4 capsules by mouth 2 times daily     Dispense:  240 capsule     Refill:  1    dronabinol (MARINOL) 2.5 MG capsule     Sig: Take 1 capsule by mouth 2 times daily (before meals) for 30 days.      Dispense:  60 capsule     Refill:  0    sertraline (ZOLOFT) 50 MG tablet     Sig: Take 1 tablet by mouth daily     Dispense:  30 tablet     Refill:  1

## 2021-11-18 ENCOUNTER — TELEPHONE (OUTPATIENT)
Dept: PSYCHIATRY | Age: 86
End: 2021-11-18

## 2021-11-18 NOTE — TELEPHONE ENCOUNTER
Received another phone call from pts daughter. Provider is currently seeing patients in the clinic and voiced she will call the family afterwards this afternoon. Daughter voiced understanding.

## 2021-11-18 NOTE — TELEPHONE ENCOUNTER
Patient daughter Hank Soni called into office today regarding a follow up conversation on medications, explained to daughter that provider was currently out of the office until this afternoon and would give the message.  Alejandras phone number is [phone]  Please Advise

## 2021-11-19 ENCOUNTER — TELEPHONE (OUTPATIENT)
Dept: PSYCHIATRY | Age: 86
End: 2021-11-19

## 2021-11-19 DIAGNOSIS — G30.9 ALZHEIMER'S DEMENTIA WITH BEHAVIORAL DISTURBANCE, UNSPECIFIED TIMING OF DEMENTIA ONSET: Primary | ICD-10-CM

## 2021-11-19 DIAGNOSIS — Z79.899 ON VALPROIC ACID THERAPY: ICD-10-CM

## 2021-11-19 DIAGNOSIS — F51.04 PSYCHOPHYSIOLOGICAL INSOMNIA: ICD-10-CM

## 2021-11-19 DIAGNOSIS — F02.81 ALZHEIMER'S DEMENTIA WITH BEHAVIORAL DISTURBANCE, UNSPECIFIED TIMING OF DEMENTIA ONSET: Primary | ICD-10-CM

## 2021-11-19 RX ORDER — TRAZODONE HYDROCHLORIDE 50 MG/1
50 TABLET ORAL NIGHTLY PRN
Qty: 30 TABLET | Refills: 0 | Status: SHIPPED | OUTPATIENT
Start: 2021-11-19 | End: 2021-12-01 | Stop reason: SDUPTHER

## 2021-11-19 RX ORDER — HALOPERIDOL 2 MG/1
2 TABLET ORAL 2 TIMES DAILY
Qty: 60 TABLET | Refills: 0 | Status: SHIPPED | OUTPATIENT
Start: 2021-11-19 | End: 2021-12-01 | Stop reason: SDUPTHER

## 2021-11-19 NOTE — TELEPHONE ENCOUNTER
Patient taking Depakote 500 mg BID now, sleeping OK at night, but continues to be combative, resisting care throughout the day. · Will start Haldol 2 mg BID  · Start trazodone 50 mg PRN  · Blood work ordered on 11/10/2021, ordered again today: CBC, BMP and Depakote level. They will bring patient in ASAP to have blood drawn. · Called Cover My Meds to see why Marinol was not approved as no request for prior authorization was received  · Cover My Meds had incorrect FAX number on file  · Prior approval completed.  Prescription can be picked up today  · Time spent on phone, completing orders, documentation and prior authorization = 1 hour and 5 minutes

## 2021-12-01 ENCOUNTER — HOSPITAL ENCOUNTER (OUTPATIENT)
Dept: PSYCHIATRY | Age: 86
Setting detail: THERAPIES SERIES
Discharge: HOME | End: 2021-12-01
Payer: COMMERCIAL

## 2021-12-01 ENCOUNTER — HOSPITAL ENCOUNTER (OUTPATIENT)
Age: 86
Discharge: HOME | End: 2021-12-01
Payer: COMMERCIAL

## 2021-12-01 DIAGNOSIS — G30.9 ALZHEIMER'S DEMENTIA WITH BEHAVIORAL DISTURBANCE, UNSPECIFIED TIMING OF DEMENTIA ONSET: Primary | ICD-10-CM

## 2021-12-01 DIAGNOSIS — F33.1 MODERATE RECURRENT MAJOR DEPRESSION (HCC): ICD-10-CM

## 2021-12-01 DIAGNOSIS — E78.49 OTHER HYPERLIPIDEMIA: Chronic | ICD-10-CM

## 2021-12-01 DIAGNOSIS — F51.04 PSYCHOPHYSIOLOGICAL INSOMNIA: ICD-10-CM

## 2021-12-01 DIAGNOSIS — F02.81 ALZHEIMER'S DEMENTIA WITH BEHAVIORAL DISTURBANCE, UNSPECIFIED TIMING OF DEMENTIA ONSET: Primary | ICD-10-CM

## 2021-12-01 DIAGNOSIS — R63.0 POOR APPETITE: ICD-10-CM

## 2021-12-01 DIAGNOSIS — E78.5 HYPERLIPIDEMIA, UNSPECIFIED HYPERLIPIDEMIA TYPE: ICD-10-CM

## 2021-12-01 PROBLEM — F02.818 ALZHEIMER'S DEMENTIA WITH BEHAVIORAL DISTURBANCE (HCC): Chronic | Status: ACTIVE | Noted: 2021-09-21

## 2021-12-01 PROBLEM — F03.91 MAJOR NEUROCOGNITIVE DISORDER DUE TO ALZHEIMER'S DISEASE, PROBABLE, WITH BEHAVIORAL DISTURBANCE: Status: RESOLVED | Noted: 2021-08-19 | Resolved: 2021-12-01

## 2021-12-01 LAB
ANION GAP SERPL CALCULATED.3IONS-SCNC: 3 MMOL/L (ref 4–16)
BACTERIA: ABNORMAL /HPF
BASOPHILS ABSOLUTE: 0.1 K/CU MM
BASOPHILS RELATIVE PERCENT: 0.9 % (ref 0–1)
BILIRUBIN URINE: NEGATIVE MG/DL
BLOOD, URINE: NEGATIVE
BUN BLDV-MCNC: 27 MG/DL (ref 6–23)
CALCIUM SERPL-MCNC: 8.6 MG/DL (ref 8.3–10.6)
CAST TYPE: NEGATIVE /HPF
CHLORIDE BLD-SCNC: 106 MMOL/L (ref 99–110)
CHOLESTEROL, FASTING: 203 MG/DL
CLARITY: ABNORMAL
CO2: 32 MMOL/L (ref 21–32)
COLOR: YELLOW
CREAT SERPL-MCNC: 0.7 MG/DL (ref 0.6–1.1)
CRYSTAL TYPE: NEGATIVE /HPF
DIFFERENTIAL TYPE: ABNORMAL
EOSINOPHILS ABSOLUTE: 0.1 K/CU MM
EOSINOPHILS RELATIVE PERCENT: 2.1 % (ref 0–3)
EPITHELIAL CELLS, UA: ABNORMAL /HPF
GFR AFRICAN AMERICAN: >60 ML/MIN/1.73M2
GFR NON-AFRICAN AMERICAN: >60 ML/MIN/1.73M2
GLUCOSE FASTING: 95 MG/DL (ref 70–99)
GLUCOSE, URINE: NEGATIVE MG/DL
HCT VFR BLD CALC: 42.6 % (ref 37–47)
HDLC SERPL-MCNC: 55 MG/DL
HEMOGLOBIN: 14 GM/DL (ref 12.5–16)
IMMATURE NEUTROPHIL %: 0.4 % (ref 0–0.43)
KETONES, URINE: NEGATIVE MG/DL
LDL CHOLESTEROL DIRECT: 121 MG/DL
LEUKOCYTE ESTERASE, URINE: NEGATIVE
LYMPHOCYTES ABSOLUTE: 1.4 K/CU MM
LYMPHOCYTES RELATIVE PERCENT: 26.9 % (ref 24–44)
MCH RBC QN AUTO: 29.9 PG (ref 27–31)
MCHC RBC AUTO-ENTMCNC: 32.9 % (ref 32–36)
MCV RBC AUTO: 91 FL (ref 78–100)
MONOCYTES ABSOLUTE: 0.5 K/CU MM
MONOCYTES RELATIVE PERCENT: 10 % (ref 0–4)
NITRITE URINE, QUANTITATIVE: NEGATIVE
PDW BLD-RTO: 12.9 % (ref 11.7–14.9)
PH, URINE: 5.5 (ref 5–8)
PLATELET # BLD: 175 K/CU MM (ref 140–440)
PMV BLD AUTO: 9.9 FL (ref 7.5–11.1)
POTASSIUM SERPL-SCNC: 4.2 MMOL/L (ref 3.5–5.1)
PROTEIN UA: NEGATIVE MG/DL
RBC # BLD: 4.68 M/CU MM (ref 4.2–5.4)
RBC URINE: NEGATIVE /HPF (ref 0–6)
SEGMENTED NEUTROPHILS ABSOLUTE COUNT: 3.2 K/CU MM
SEGMENTED NEUTROPHILS RELATIVE PERCENT: 59.7 % (ref 36–66)
SODIUM BLD-SCNC: 141 MMOL/L (ref 135–145)
SPECIFIC GRAVITY UA: 1.02 (ref 1–1.03)
TOTAL IMMATURE NEUTOROPHIL: 0.02 K/CU MM
TRIGLYCERIDE, FASTING: 135 MG/DL
UROBILINOGEN, URINE: 0.2 MG/DL (ref 0.2–1)
WBC # BLD: 5.3 K/CU MM (ref 4–10.5)
WBC UA: NEGATIVE /HPF (ref 0–5)

## 2021-12-01 PROCEDURE — 80164 ASSAY DIPROPYLACETIC ACD TOT: CPT

## 2021-12-01 PROCEDURE — 99214 OFFICE O/P EST MOD 30 MIN: CPT | Performed by: NURSE PRACTITIONER

## 2021-12-01 PROCEDURE — 80048 BASIC METABOLIC PNL TOTAL CA: CPT

## 2021-12-01 PROCEDURE — 90837 PSYTX W PT 60 MINUTES: CPT | Performed by: NURSE PRACTITIONER

## 2021-12-01 PROCEDURE — 85025 COMPLETE CBC W/AUTO DIFF WBC: CPT

## 2021-12-01 PROCEDURE — 81001 URINALYSIS AUTO W/SCOPE: CPT

## 2021-12-01 PROCEDURE — 80061 LIPID PANEL: CPT

## 2021-12-01 PROCEDURE — 80165 DIPROPYLACETIC ACID FREE: CPT

## 2021-12-01 PROCEDURE — 36415 COLL VENOUS BLD VENIPUNCTURE: CPT

## 2021-12-01 RX ORDER — HALOPERIDOL 2 MG/1
2 TABLET ORAL 2 TIMES DAILY
Qty: 60 TABLET | Refills: 2 | Status: SHIPPED | OUTPATIENT
Start: 2021-12-01 | End: 2021-12-08 | Stop reason: SDUPTHER

## 2021-12-01 RX ORDER — TRAZODONE HYDROCHLORIDE 50 MG/1
50 TABLET ORAL NIGHTLY PRN
Qty: 30 TABLET | Refills: 2 | Status: SHIPPED | OUTPATIENT
Start: 2021-12-01 | End: 2021-12-08 | Stop reason: SDUPTHER

## 2021-12-01 RX ORDER — DIVALPROEX SODIUM 125 MG/1
500 CAPSULE, COATED PELLETS ORAL 2 TIMES DAILY
Qty: 240 CAPSULE | Refills: 2 | Status: SHIPPED | OUTPATIENT
Start: 2021-12-01 | End: 2022-01-05 | Stop reason: SDUPTHER

## 2021-12-01 RX ORDER — DRONABINOL 2.5 MG/1
2.5 CAPSULE ORAL
Qty: 60 CAPSULE | Refills: 2 | Status: SHIPPED | OUTPATIENT
Start: 2021-12-01 | End: 2022-03-01

## 2021-12-01 NOTE — PROGRESS NOTES
Behavioral Health Consultation  Rick Tinsley CNP-BC  12/1/2021, 3:11 PM      Time spent with Patient:  60 minutes  This was a outpatient visit. Patient Location: Outpatient [address] [place] [address]  Provider Location: [facility]    Chief Complaint:   Major neurocognitive disorder with behavioral disturbance, depression, anxiety     Referred: Astrid Hung RN ([facility])    [place]:  Reason for visit is medication management follow up. She has been compliant with medications. Pt reports that medications have  been working. Pt admits to side effects from medications - appeared over-medicated with Depakote. Pt admits to hallucinations. Pt reports there has been no changes to appetite - eating 1/2 meal 2 x per day. Pt reports sleep has been ok. Sleeping throughout night - gets up x 2 to use bathroom. Pt denies  current exercise. Pt denies current suicidal ideation, plan and intent. Pt  denies current homicidal ideation, plan and Critias@[email or web address]). Two of her daughters are present at today's visit. They state the patient is depressed, having episodes of weeping, anxiety but decreased since starting Haldol. Stopped Aricept - wasn't helpful. Celexa didn't work     Social: limited in her ability to socialize due to dementia     Past Psychiatric history:   The patient has a history of  depression and dementia. Current treatment includes Anti-depressant: sertraline 50 mg daily, mood stabilizer: Depakote 500 mg daily, antipsychotic: Haldol 2 mg BID, hypnotic: trazodone 50 mg HS PRN, appetite stimulant: Marinol 2.5 mg BID. Previous treatment has included: Celexa - 30 mg at bedtime.      Family Mental Health history:   Pertinent family history:  Grandmother - \"dementia\", Shanice Gomez (sister at 79) passed dementia    Past Medical History:   Diagnosis Date    Alzheimer's dementia with [facility] disturbance ([facility] [address]     Basal cell carcinoma (BCC) in situ of skin     forehead    Hyperlipidemia     Hypertension     Moderate episode of recurrent major depressive disorder (HCC)     On valproic acid therapy     Osteoarthritis     Poor appetite     Psychophysiological insomnia      MSE:  Unable to complete     History:      Review of Systems:       Current Outpatient Medications:     divalproex (DEPAKOTE SPRINKLES) 125 MG capsule, Take 4 capsules by mouth 2 times daily, Disp: 240 capsule, Rfl: 2    sertraline (ZOLOFT) 50 MG tablet, Take 1 tablet by mouth daily, Disp: 30 tablet, Rfl: 2    traZODone (DESYREL) 50 MG tablet, Take 1 tablet by mouth nightly as needed for Sleep, Disp: 30 tablet, Rfl: 2    dronabinol (MARINOL) 2.5 MG capsule, Take 1 capsule by mouth 2 times daily (before meals) for 90 days. , Disp: 60 capsule, Rfl: 2    haloperidol (HALDOL) 2 MG tablet, Take 1 tablet by mouth 2 times daily, Disp: 60 tablet, Rfl: 2    vitamin D3 (CHOLECALCIFEROL) 10 MCG (400 UNIT) TABS tablet, Take 400 Units by mouth daily, Disp: , Rfl:     ibuprofen (ADVIL;MOTRIN) 200 MG tablet, Take 200 mg by mouth every 6 hours as needed for Pain, Disp: , Rfl:      PDMP Monitoring:    Last Controlled Substance Monitoring Documentation      I - STRUCTURED OUTPATIENT from 12/1/2021 in [facility]   Periodic Controlled Substance Monitoring No signs of potential drug abuse or diversion identified. filed at 12/01/2021 1443        OARRS checked and there were no signs of substance abuse, or prescription misuse. Social History     Socioeconomic History    Marital status:       Spouse name: Not on file    Number of children: Not on file    Years of education: Not on file    Highest education level: Not on file   Occupational History    Not on file   Tobacco Use    Smoking status: Never Smoker    Smokeless tobacco: Never Used   Substance and Sexual Activity    Alcohol use: Not on file    Drug use: Not on file    Sexual activity: Not on file   Other Topics Concern    Not on file Social History Narrative    Not on file     Social Determinants of Health     Financial Resource Strain:     Difficulty of Paying Living Expenses: Not on file   Food Insecurity:     Worried About Running Out of Food in the Last Year: Not on file    Radha of Food in the Last Year: Not on file   Transportation Needs:     Lack of Transportation (Medical): Not on file    Lack of Transportation (Non-Medical): Not on file   Physical Activity:     Days of Exercise per Week: Not on file    Minutes of Exercise per Session: Not on file   Stress:     Feeling of Stress : Not on file   Social Connections:     Frequency of Communication with Friends and Family: Not on file    Frequency of Social Gatherings with Friends and Family: Not on file    Attends Buddhist Services: Not on file    Active Member of [address] [place] [address] [employer] or Organizations: Not on file    Attends Club or Organization Meetings: Not on file    Marital Status: Not on file   Intimate Partner Violence:     Fear of Current or Ex-Partner: Not on file    Emotionally Abused: Not on file    Physically Abused: Not on file    Sexually Abused: Not on file   Housing Stability:     Unable to Pay for Housing in the Last Year: Not on file    Number of [place] in the Last Year: Not on file    Unstable Housing in the Last Year: Not on file       TOBACCO: Luca Tinoco  reports that she has never smoked. She has never used smokeless tobacco.  ETOH: Luca Tinoco  has no history on file for alcohol use. Past Medical History:   Diagnosis Date    Alzheimer's dementia with behavioral disturbance ([facility] [address]     Basal cell carcinoma (BCC) in situ of skin     forehead    Hyperlipidemia     Hypertension     Moderate episode of recurrent major depressive disorder (HCC)     On valproic acid therapy     Osteoarthritis     Poor appetite     Psychophysiological insomnia       Metabolic monitoring is being done by PCP.    Family History   Problem Relation Age of Onset    Cancer visits  · Continue work with PCP to manage medical concerns, and [facility] for continued follow-up. Orders Placed This Encounter   Procedures    Lipid Panel     Order Specific Question:   Is Patient Fasting?/# of Hours     Answer:   12     Lipid Panel     Standing Status:   Future     Standing Expiration Date:   12/1/2022     Order Specific Question:   Is Patient Fasting?/# of Hours     Answer:   15     External Referral To Neurology     Referral Priority:   Routine     Referral Type:   Eval and Treat     Referral Reason:   Specialty Services Required     Referred to Provider:   Gaurang Cordero DO     Requested Specialty:   Neurology     Number of Visits Requested:   1    Discharge patient     Standing Status:   Standing     Number of Occurrences:   1     Order Specific Question:   Discharge Disposition     Answer:   Home      Orders Placed This Encounter   Medications    divalproex (DEPAKOTE SPRINKLES) 125 MG capsule     Sig: Take 4 capsules by mouth 2 times daily     Dispense:  240 capsule     Refill:  2    sertraline (ZOLOFT) 50 MG tablet     Sig: Take 1 tablet by mouth daily     Dispense:  30 tablet     Refill:  2    traZODone (DESYREL) 50 MG tablet     Sig: Take 1 tablet by mouth nightly as needed for Sleep     Dispense:  30 tablet     Refill:  2    dronabinol (MARINOL) 2.5 MG capsule     Sig: Take 1 capsule by mouth 2 times daily (before meals) for 90 days.      Dispense:  60 capsule     Refill:  2    haloperidol (HALDOL) 2 MG tablet     Sig: Take 1 tablet by mouth 2 times daily     Dispense:  60 tablet     Refill:  2       Pt interventions:    Discussed importance of medication adherence, Discussed risks, benefits, side effects of medication and need for follow up treatment, Provided education and Supportive techniques

## 2021-12-04 LAB
VALPROIC ACID % FREE: 16 % (ref 5–18)
VALPROIC ACID TOTAL: 94 UG/ML (ref 50–125)
VALPROIC ACID, FREE: 15 UG/ML (ref 7–23)

## 2021-12-07 DIAGNOSIS — F51.04 PSYCHOPHYSIOLOGICAL INSOMNIA: ICD-10-CM

## 2021-12-07 DIAGNOSIS — G30.9 ALZHEIMER'S DEMENTIA WITH BEHAVIORAL DISTURBANCE, UNSPECIFIED TIMING OF DEMENTIA ONSET: ICD-10-CM

## 2021-12-07 DIAGNOSIS — F02.81 ALZHEIMER'S DEMENTIA WITH BEHAVIORAL DISTURBANCE, UNSPECIFIED TIMING OF DEMENTIA ONSET: ICD-10-CM

## 2021-12-07 NOTE — TELEPHONE ENCOUNTER
Patients daughter called in today regarding needing a refill on the patients haldol and trazodone, [facility] in [place]. Daughter also requested the results on patients current lab draws, no note in from the provider from results, and provider is currently seeing patients. Advised daughter I would send a message to the provider and would call to discuss results once a response is received, daughter voiced understanding.

## 2021-12-08 RX ORDER — HALOPERIDOL 2 MG/1
2 TABLET ORAL 2 TIMES DAILY
Qty: 60 TABLET | Refills: 2 | Status: SHIPPED | OUTPATIENT
Start: 2021-12-08 | End: 2022-06-21 | Stop reason: SDUPTHER

## 2021-12-08 RX ORDER — TRAZODONE HYDROCHLORIDE 50 MG/1
50 TABLET ORAL NIGHTLY PRN
Qty: 30 TABLET | Refills: 2 | Status: SHIPPED | OUTPATIENT
Start: 2021-12-08 | End: 2022-03-21 | Stop reason: SDUPTHER

## 2021-12-31 ENCOUNTER — TELEPHONE (OUTPATIENT)
Dept: PSYCHIATRY | Age: 86
End: 2021-12-31

## 2021-12-31 DIAGNOSIS — G30.9 ALZHEIMER'S DEMENTIA WITH BEHAVIORAL DISTURBANCE, UNSPECIFIED TIMING OF DEMENTIA ONSET: Primary | Chronic | ICD-10-CM

## 2021-12-31 DIAGNOSIS — Z51.81 MEDICATION MONITORING ENCOUNTER: ICD-10-CM

## 2021-12-31 DIAGNOSIS — F02.81 ALZHEIMER'S DEMENTIA WITH BEHAVIORAL DISTURBANCE, UNSPECIFIED TIMING OF DEMENTIA ONSET: Primary | Chronic | ICD-10-CM

## 2021-12-31 DIAGNOSIS — F33.1 MODERATE RECURRENT MAJOR DEPRESSION (HCC): ICD-10-CM

## 2021-12-31 NOTE — TELEPHONE ENCOUNTER
Estiven Chiu is a 80 y.o. female evaluated via telephone on 1/3/2022. Consent:  She and/or health care decision maker is aware that that she may receive a bill for this telephone service, depending on her insurance coverage, and has provided verbal consent to proceed: Yes    Documentation:  I communicated with the patient and/or health care decision maker about valproic acid level and GeneSight test results. Discussed current medication regimen in depth and detail. Patient's daughter David Clay states her mother is now sleeping through the night and is eating well. She has noticed that her mother's arms tremble. Since starting the Haldol, the patient has been much more manageable at home, however David Clay notes that the patient is more subdued and has gone from [place] around all the time\" to not really walking much - \"she's kind of wobbly. \"  . Details of this discussion including any medical advice provided: Discussed decreasing dose of Depakote from 500 mg BID to just 500 mg HS to see if patient becomes more alert and mobile. However, cautioned patient's daughter that if agitation/aggression return, resume BID dosing of Depakote. Will continue Haldol and trazodone. Discussed switching from Zoloft to Effexor as Effexor is more genetically concordant. Will follow up via telephone in approximately one week to see how the patient is doing on the decreased dose of Depakote and discuss changing from Zoloft to Effexor. Will also mail GeneSight results to patient's daughter along with lab results. I affirm this is a Patient Initiated Episode with a Patient who has not had a related appointment within my department in the past 7 days or scheduled within the next 24 hours. Patient identification was verified at the start of the visit: Yes    Total Time: minutes: 21-30 minutes + 10 minutes = 40 minutes on telephone visit.     The visit was conducted pursuant to the emergency declaration under the [address] and the National Emergencies Act, [facility] waiver authority and the [employer] [place] General Act. Patient identification was verified, and a caregiver was present when appropriate. The patient was located in a state where the provider was credentialed to provide care.     Note: not billable if this call serves to triage the patient into an appointment for the relevant concern    ANGELIC Partida - CNP

## 2022-01-03 ENCOUNTER — TELEPHONE (OUTPATIENT)
Dept: PSYCHIATRY | Age: 87
End: 2022-01-03

## 2022-01-03 ENCOUNTER — TELEPHONE (OUTPATIENT)
Dept: PSYCHIATRY | Age: 87
End: 2022-01-03
Payer: COMMERCIAL

## 2022-01-03 PROBLEM — R63.0 POOR APPETITE: Status: RESOLVED | Noted: 2021-12-01 | Resolved: 2022-01-03

## 2022-01-03 PROBLEM — F51.04 PSYCHOPHYSIOLOGICAL INSOMNIA: Chronic | Status: RESOLVED | Noted: 2021-12-01 | Resolved: 2022-01-03

## 2022-01-03 PROCEDURE — 90834 PSYTX W PT 45 MINUTES: CPT | Performed by: NURSE PRACTITIONER

## 2022-01-03 NOTE — TELEPHONE ENCOUNTER
Depakote refill needed. Zoloft was given to them by pharmacy at 25mg instead of 50mg. NP; Derick Rasmussenor, reports plans to contact pharmacy and get medications refilled.

## 2022-01-04 ENCOUNTER — TELEPHONE (OUTPATIENT)
Dept: PSYCHIATRY | Age: 87
End: 2022-01-04

## 2022-01-04 NOTE — TELEPHONE ENCOUNTER
Informed EC that genesight and lab results were being mailed to her.  Scheduled telehealth appointment for 1/19 at 1400

## 2022-01-05 DIAGNOSIS — F02.81 ALZHEIMER'S DEMENTIA WITH BEHAVIORAL DISTURBANCE, UNSPECIFIED TIMING OF DEMENTIA ONSET: ICD-10-CM

## 2022-01-05 DIAGNOSIS — G30.9 ALZHEIMER'S DEMENTIA WITH BEHAVIORAL DISTURBANCE, UNSPECIFIED TIMING OF DEMENTIA ONSET: ICD-10-CM

## 2022-01-05 DIAGNOSIS — F33.1 MODERATE RECURRENT MAJOR DEPRESSION (HCC): ICD-10-CM

## 2022-01-05 RX ORDER — DIVALPROEX SODIUM 125 MG/1
500 CAPSULE, COATED PELLETS ORAL 2 TIMES DAILY
Qty: 240 CAPSULE | Refills: 2 | Status: SHIPPED | OUTPATIENT
Start: 2022-01-05

## 2022-01-05 NOTE — TELEPHONE ENCOUNTER
Patient's daughter, Krys Vallejo called to request refills. Refills sent to pharmacy electronically.     Orders Placed This Encounter   Medications    divalproex (DEPAKOTE SPRINKLES) 125 MG capsule     Sig: Take 4 capsules by mouth 2 times daily     Dispense:  240 capsule     Refill:  2    sertraline (ZOLOFT) 50 MG tablet     Sig: Take 1 tablet by mouth daily     Dispense:  30 tablet     Refill:  2

## 2022-01-18 ENCOUNTER — TELEPHONE (OUTPATIENT)
Dept: PSYCHIATRY | Age: 87
End: 2022-01-18

## 2022-01-18 NOTE — TELEPHONE ENCOUNTER
Called to remind of appointment tomorrow at 1300. Requested to reschedule for 1 week later d/t she has not yet decreased the medication to the amount requested by NP; Wilhemina Claude, but believes next week will be ok.

## 2022-01-25 ENCOUNTER — TELEPHONE (OUTPATIENT)
Dept: PSYCHIATRY | Age: 87
End: 2022-01-25

## 2022-01-25 NOTE — TELEPHONE ENCOUNTER
Reminder phone given. Daughter reports need to reschedule d/t client's spouse is not doing well and she needs to focus on him. Will call to reschedule at a later date.

## 2022-01-26 ENCOUNTER — TELEPHONE (OUTPATIENT)
Dept: PSYCHIATRY | Age: 87
End: 2022-01-26

## 2022-01-26 NOTE — TELEPHONE ENCOUNTER
Informed that NP; Zaria Foote requesting that an appointment be schedule before UA order is written.

## 2022-03-02 ENCOUNTER — TELEPHONE (OUTPATIENT)
Dept: PSYCHIATRY | Age: 87
End: 2022-03-02

## 2022-03-02 NOTE — TELEPHONE ENCOUNTER
Informed that NP will call in prescription for trazadone and willing to have next appointment be telehealth. Also informed that NP requesting that cilent be in person at least x1 yearly.

## 2022-03-03 ENCOUNTER — HOSPITAL ENCOUNTER (OUTPATIENT)
Age: 87
Setting detail: SPECIMEN
Discharge: HOME | End: 2022-03-03
Payer: COMMERCIAL

## 2022-03-03 LAB
BACTERIA: NEGATIVE /HPF
BILIRUBIN URINE: NEGATIVE MG/DL
BLOOD, URINE: NEGATIVE
CAST TYPE: ABNORMAL /HPF
CLARITY: ABNORMAL
COLOR: YELLOW
COMMENT UA: ABNORMAL
CRYSTAL TYPE: ABNORMAL /HPF
EPITHELIAL CELLS, UA: NEGATIVE /HPF
GLUCOSE, URINE: NEGATIVE MG/DL
KETONES, URINE: NEGATIVE MG/DL
LEUKOCYTE ESTERASE, URINE: NEGATIVE
NITRITE URINE, QUANTITATIVE: NEGATIVE
PH, URINE: 7.5 (ref 5–8)
PROTEIN UA: 30 MG/DL
RBC URINE: ABNORMAL /HPF (ref 0–6)
SPECIFIC GRAVITY UA: 1.01 (ref 1–1.03)
UROBILINOGEN, URINE: 0.2 MG/DL (ref 0.2–1)
WBC UA: ABNORMAL /HPF (ref 0–5)

## 2022-03-03 PROCEDURE — 81001 URINALYSIS AUTO W/SCOPE: CPT

## 2022-03-07 ENCOUNTER — TELEPHONE (OUTPATIENT)
Dept: PSYCHIATRY | Age: 87
End: 2022-03-07

## 2022-03-07 NOTE — TELEPHONE ENCOUNTER
Phone reminder given that client has an appointment tomorrow at [phone] with NP; Angelito Dia.

## 2022-03-08 ENCOUNTER — HOSPITAL ENCOUNTER (OUTPATIENT)
Dept: PSYCHIATRY | Age: 87
Setting detail: THERAPIES SERIES
Discharge: HOME | End: 2022-03-08
Payer: COMMERCIAL

## 2022-03-08 DIAGNOSIS — F33.1 MODERATE RECURRENT MAJOR DEPRESSION (HCC): Primary | ICD-10-CM

## 2022-03-08 DIAGNOSIS — Z51.81 MEDICATION MONITORING ENCOUNTER: ICD-10-CM

## 2022-03-08 DIAGNOSIS — F02.81 ALZHEIMER'S DEMENTIA WITH BEHAVIORAL DISTURBANCE, UNSPECIFIED TIMING OF DEMENTIA ONSET: Chronic | ICD-10-CM

## 2022-03-08 DIAGNOSIS — G30.9 ALZHEIMER'S DEMENTIA WITH BEHAVIORAL DISTURBANCE, UNSPECIFIED TIMING OF DEMENTIA ONSET: Chronic | ICD-10-CM

## 2022-03-08 PROCEDURE — 99443 PR PHYS/QHP TELEPHONE EVALUATION 21-30 MIN: CPT | Performed by: NURSE PRACTITIONER

## 2022-03-08 NOTE — PROGRESS NOTES
Behavioral Health Consultation Virtual Visit via TELEPHONE  Fidencio STRAUSS  3/10/2022, 12:53 PM     Ivonne Cornell is a 80 y.o. female evaluated via telephone on 3/8/2022. Start time:1407  End time: 7136    Consent:  She and/or health care decision maker is aware that that she may receive a bill for this telephone service, depending on her insurance coverage, and has provided verbal consent to proceed: Yes    Documentation:  I communicated with the patient and/or health care decision maker about please see documentation below. Details of this discussion including any medical advice provided: please see documentation below. I affirm this is a Patient Initiated Episode with a Patient who has not had a related appointment within my department in the past 7 days or scheduled within the next 24 hours. Patient identification was verified at the start of the visit: Yes    Total Time: minutes: 21-30 minutes    The visit was conducted pursuant to the emergency declaration under the [address] [place] [address], [facility] authority and the [employer] and [employer] General Act. Patient identification was verified, and a caregiver was present when appropriate. The patient was located in a state where the provider was credentialed to provide care. Note: not billable if this call serves to triage the patient into an appointment for the relevant concern      ANGELIC Thomas CNP     Time spent with Patient:  24 minutes  This was a outpatient visit. Patient Location: home  Provider Location: [place] [address]    Chief Complaint:   Major neurocognitive disorder with behavioral disturbance, depression, anxiety     Referred: Ada Gomez RN ([facility])    [place]:  Reason for visit is medication management follow up. She has been compliant with medications. Pt reports that medications have  been working.   Pt admits to side effects from medications - appeared over-medicated with Depakote. Pt admits to hallucinations. Pt reports there has been no changes to appetite - eating 1/2 meal 2 x per day. Pt reports sleep has been ok. Sleeping throughout night - gets up x 2 to use bathroom. Pt denies  current exercise. Pt denies current suicidal ideation, plan and intent. Pt  denies current homicidal ideation, plan and [employer]el[employer]). Two of her daughters are present at today's visit. They state the patient is depressed, having episodes of weeping, anxiety but decreased since starting Haldol. Stopped Aricept - wasn't helpful. Celexa didn't work     Social: limited in her ability to socialize due to dementia     Past Psychiatric history:   The patient has a history of  depression and dementia. Current treatment includes Anti-depressant: sertraline 50 mg daily, mood stabilizer: Depakote 500 mg daily, antipsychotic: Haldol 2 mg BID, hypnotic: trazodone 50 mg HS PRN, appetite stimulant: Marinol 2.5 mg BID. Previous treatment has included: Celexa - 30 mg at bedtime.      Family Mental Health history:   Pertinent family history:  Grandmother - \"dementia\", Abilio Guerin (sister at 79) passed dementia    Past Medical History:   Diagnosis Date    Alzheimer's dementia with [facility] disturbance [facility] [address]     Basal cell carcinoma (BCC) in situ of skin     forehead    Hyperlipidemia     Hypertension     Moderate episode of recurrent major depressive disorder (HCC)     On valproic acid therapy     Osteoarthritis     Poor appetite     Psychophysiological insomnia      MSE:  Unable to complete     History:      Review of Systems:       Current Outpatient Medications:     divalproex (DEPAKOTE SPRINKLES) 125 MG capsule, Take 4 capsules by mouth 2 times daily, Disp: 240 capsule, Rfl: 2    sertraline (ZOLOFT) 50 MG tablet, Take 1 tablet by mouth daily, Disp: 30 tablet, Rfl: 2    haloperidol (HALDOL) 2 MG tablet, Take 1 tablet by mouth 2 times daily, Disp: 60 tablet, Rfl: 2    traZODone (DESYREL) 50 MG tablet, Take 1 tablet by mouth nightly as needed for Sleep, Disp: 30 tablet, Rfl: 2    vitamin D3 (CHOLECALCIFEROL) 10 MCG (400 UNIT) TABS tablet, Take 400 Units by mouth daily, Disp: , Rfl:     ibuprofen (ADVIL;MOTRIN) 200 MG tablet, Take 200 mg by mouth every 6 hours as needed for Pain, Disp: , Rfl:      PDMP Monitoring:    Last Controlled Substance Monitoring Documentation      I - STRUCTURED OUTPATIENT from 12/1/2021 in [facility]   Periodic Controlled Substance Monitoring No signs of potential drug abuse or diversion identified. filed at 12/01/2021 1443        OARRS checked and there were no signs of substance abuse, or prescription misuse. Social History     Socioeconomic History    Marital status:      Spouse name: Not on file    Number of children: Not on file    Years of education: Not on file    Highest education level: Not on file   Occupational History    Not on file   Tobacco Use    Smoking status: Never Smoker    Smokeless tobacco: Never Used   Substance and Sexual Activity    Alcohol use: Not on file    Drug use: Not on file    Sexual activity: Not on file   Other Topics Concern    Not on file   Social History Narrative    Not on file     Social Determinants of Health     Financial Resource Strain:     Difficulty of Paying Living Expenses: Not on file   Food Insecurity:     Worried About Running Out of Food in the Last Year: Not on file    Radha of Food in the Last Year: Not on file   Transportation Needs:     Lack of Transportation (Medical): Not on file    Lack of Transportation (Non-Medical):  Not on file   Physical Activity:     Days of Exercise per Week: Not on file    Minutes of Exercise per Session: Not on file   Stress:     Feeling of Stress : Not on file   Social Connections:     Frequency of Communication with Friends and Family: Not on file    Frequency of Social Gatherings with Friends and Family: Not on file    Attends Oriental orthodox Services: Not on file    Active Member of Clubs or Organizations: Not on file    Attends Club or Organization Meetings: Not on file    Marital Status: Not on file   Intimate Partner Violence:     Fear of Current or Ex-Partner: Not on file    Emotionally Abused: Not on file    Physically Abused: Not on file    Sexually Abused: Not on file   Housing Stability:     Unable to Pay for Housing in the Last Year: Not on file    Number of [place] in the Last Year: Not on file    Unstable Housing in the Last Year: Not on file       TOBACCO: Venecia Pederson  reports that she has never smoked. She has never used smokeless tobacco.  ETOH: Venecia Pederson  has no history on file for alcohol use. Past Medical History:   Diagnosis Date    Alzheimer's dementia with behavioral disturbance ([facility] [address]     Basal cell carcinoma (BCC) in situ of skin     forehead    Hyperlipidemia     Hypertension     Moderate episode of recurrent major depressive disorder (HCC)     On valproic acid therapy     Osteoarthritis     Poor appetite     Psychophysiological insomnia       Metabolic monitoring is being done by PCP.    Family History   Problem Relation Age of Onset   [facility] Cancer Mother         lung    Other Father         aneurysm    Seizures Sister     Alzheimer's Disease Sister      Last Labs: No results found for: LABA1C  No results found for: EAG   Lab Results   Component Value Date    WBC 5.3 12/01/2021    HGB 14.0 12/01/2021    HCT 42.6 12/01/2021    MCV 91.0 12/01/2021     12/01/2021    LYMPHOPCT 26.9 12/01/2021    RBC 4.68 12/01/2021    MCH 29.9 12/01/2021    MCHC 32.9 12/01/2021    RDW 12.9 12/01/2021          Lab Results   Component Value Date     12/01/2021    K 4.2 12/01/2021     12/01/2021    CO2 32 12/01/2021    BUN 27 (H) 12/01/2021    CREATININE 0.7 12/01/2021    GLUCOSE 101 (H) 08/19/2021    CALCIUM 8.6 12/01/2021    PROT 6.8 08/19/2021    LABALBU 4.3 08/19/2021    BILITOT 0.4 08/19/2021    ALKPHOS 65 08/19/2021    AST 15 08/19/2021    ALT 8 (L) 08/19/2021    LABGLOM >60 12/01/2021    GFRAA >60 12/01/2021      . last    Diagnosis:      1. Moderate recurrent major depression ([facility] [address]    2. Alzheimer's dementia with behavioral disturbance, unspecified timing of dementia onset ([facility] [address]    3. Medication monitoring encounter      Plan:    · Discussed dementia in depth and detail. Advised daughters to review videos by Nely Cordero OT on YouTube about caring for a person with dementia  · Discussed current medications and rationale for use in depth and detail. · Discussed obtaining lab work today in order to review platelet count, liver and renal function  · Continue present medications  · Follow up in 3 months - will do telephone visits as needed between office visits  · Continue work with PCP to manage medical concerns, and [facility] for continued follow-up.      Pt interventions:    Discussed importance of medication adherence, Discussed risks, benefits, side effects of medication and need for follow up treatment, Provided education and Supportive techniques

## 2022-03-14 ENCOUNTER — TELEPHONE (OUTPATIENT)
Dept: PSYCHIATRY | Age: 87
End: 2022-03-14

## 2022-03-14 NOTE — TELEPHONE ENCOUNTER
Returned VM and left message informing her the NP; Kathleen Mccollum is recommending that she stop the Haldol d/t low blood pressure.

## 2022-03-21 DIAGNOSIS — F51.04 PSYCHOPHYSIOLOGICAL INSOMNIA: ICD-10-CM

## 2022-03-21 RX ORDER — TRAZODONE HYDROCHLORIDE 50 MG/1
50 TABLET ORAL NIGHTLY PRN
Qty: 30 TABLET | Refills: 2 | Status: SHIPPED | OUTPATIENT
Start: 2022-03-21

## 2022-03-21 NOTE — TELEPHONE ENCOUNTER
Orders Placed This Encounter   Medications    traZODone (DESYREL) 50 MG tablet     Sig: Take 1 tablet by mouth nightly as needed for Sleep     Dispense:  30 tablet     Refill:  2

## 2022-03-25 ENCOUNTER — TELEPHONE (OUTPATIENT)
Dept: PSYCHIATRY | Age: 87
End: 2022-03-25

## 2022-03-25 NOTE — TELEPHONE ENCOUNTER
Returned VM. Attempted to schedule appointment so they could discuss how they feel medication is not working for client. Declined at this time. Requesting labs to be ordered.

## 2022-05-26 ENCOUNTER — HOSPITAL ENCOUNTER (OUTPATIENT)
Age: 87
Setting detail: SPECIMEN
Discharge: HOME | End: 2022-05-26
Payer: COMMERCIAL

## 2022-05-26 LAB
BACTERIA: ABNORMAL /HPF
BILIRUBIN URINE: NEGATIVE MG/DL
BLOOD, URINE: NEGATIVE
CAST TYPE: ABNORMAL /HPF
CLARITY: ABNORMAL
COLOR: YELLOW
CRYSTAL TYPE: ABNORMAL /HPF
EPITHELIAL CELLS, UA: ABNORMAL /HPF
GLUCOSE, URINE: NEGATIVE MG/DL
KETONES, URINE: NEGATIVE MG/DL
LEUKOCYTE ESTERASE, URINE: ABNORMAL
NITRITE URINE, QUANTITATIVE: NEGATIVE
PH, URINE: 6 (ref 5–8)
PROTEIN UA: NEGATIVE MG/DL
RBC URINE: NEGATIVE /HPF (ref 0–6)
SPECIFIC GRAVITY UA: >1.03 (ref 1–1.03)
UROBILINOGEN, URINE: 0.2 MG/DL (ref 0.2–1)
WBC UA: ABNORMAL /HPF (ref 0–5)

## 2022-05-26 PROCEDURE — 81001 URINALYSIS AUTO W/SCOPE: CPT

## 2022-05-26 PROCEDURE — 87086 URINE CULTURE/COLONY COUNT: CPT

## 2022-05-27 LAB
CULTURE: NORMAL
Lab: NORMAL
SPECIMEN: NORMAL

## 2022-06-21 DIAGNOSIS — G30.9 ALZHEIMER'S DEMENTIA WITH BEHAVIORAL DISTURBANCE, UNSPECIFIED TIMING OF DEMENTIA ONSET: ICD-10-CM

## 2022-06-21 DIAGNOSIS — F02.81 ALZHEIMER'S DEMENTIA WITH BEHAVIORAL DISTURBANCE, UNSPECIFIED TIMING OF DEMENTIA ONSET: ICD-10-CM

## 2022-06-21 RX ORDER — HALOPERIDOL 2 MG/1
2 TABLET ORAL 2 TIMES DAILY
Qty: 60 TABLET | Refills: 2 | Status: SHIPPED | OUTPATIENT
Start: 2022-06-21

## 2023-01-29 NOTE — PROGRESS NOTES
Physical Therapy  Initial Assessment  Date: 2020  Patient Name: Marichuy Banda  MRN: [id number]  : 1935     Treatment Diagnosis: R shoulder pain    Restrictions  Position Activity Restriction  Other position/activity restrictions: none    Subjective   General  Chart Reviewed: Yes  Patient assessed for rehabilitation services?: Yes  Family / Caregiver Present: Yes  Referring Practitioner: Quita Heredia  Diagnosis: R shoulder pain  Follows Commands: Within Functional Limits  PT Visit Information  PT Insurance Information: Medicare Advantage $40 co pay  Subjective  Subjective: ongoing R shoulder pain for about 1 year possibly due to overuse with reaching- has had 2 cortizone injections most recently about 6 months ago; R shoulder pain present/worse with reaching above shoulder level or out to side  Pain Screening  Patient Currently in Pain: Yes  Pain Assessment  Pain Assessment: Faces  Charles-Baker Pain Rating: Hurts whole lot  Pain Type: Chronic pain  Pain Location: Shoulder  Pain Orientation: Right;Mid;Upper  Pain Descriptors: Aching  Pain Frequency: Intermittent  Pain Onset: On-going  Clinical Progression: Not changed  Functional Pain Assessment: Prevents or interferes some active activities and ADLs  Vital Signs  Patient Currently in Pain: Yes    Vision/Hearing       Orientation  Orientation  Overall Orientation Status: (patient has dementia)  Orientation Level:  Other (Comment)    Social/Functional History  Social/Functional History  Lives With: Family    Objective     Observation/Palpation  Posture: Fair  Palpation: minimal TTP of R shoulder    PROM RUE (degrees)  RUE PROM: Exceptions  R Shoulder Flex  0-180: supine to 165* - painfree @ end range  R Shoulder Ext Rotation  0-90: 85* in 90/90 supine- mild tightness pain  AROM RUE (degrees)  RUE General AROM: seated AROM of R shoulder limited by pain- IR behind backto T12 before pain onset - FLEX to 90* before pain onset- patient demosntrates painful arc with
oriented to person, place and time